# Patient Record
Sex: MALE | Race: WHITE | NOT HISPANIC OR LATINO | Employment: OTHER | URBAN - METROPOLITAN AREA
[De-identification: names, ages, dates, MRNs, and addresses within clinical notes are randomized per-mention and may not be internally consistent; named-entity substitution may affect disease eponyms.]

---
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
## 2023-03-04 PROBLEM — H61.23 BILATERAL IMPACTED CERUMEN: Status: ACTIVE | Noted: 2023-03-04

## 2023-03-04 PROBLEM — K59.00 CONSTIPATION: Status: ACTIVE | Noted: 2023-03-04

## 2023-03-04 PROBLEM — N62 GYNECOMASTIA, MALE: Status: ACTIVE | Noted: 2023-03-04

## 2023-03-04 PROBLEM — N64.4 BREAST PAIN IN MALE: Status: ACTIVE | Noted: 2023-03-04

## 2023-03-04 PROBLEM — M25.471 RIGHT ANKLE SWELLING: Status: ACTIVE | Noted: 2023-03-04

## 2023-03-04 PROBLEM — M25.571 RIGHT ANKLE PAIN: Status: ACTIVE | Noted: 2023-03-04

## 2023-03-04 PROBLEM — G56.01 CARPAL TUNNEL SYNDROME OF RIGHT WRIST: Status: ACTIVE | Noted: 2023-03-04

## 2023-03-04 RX ORDER — GABAPENTIN 100 MG/1
1 CAPSULE ORAL
COMMUNITY
Start: 2021-05-10 | End: 2023-03-14 | Stop reason: ALTCHOICE

## 2023-03-12 ASSESSMENT — ENCOUNTER SYMPTOMS
JOINT SWELLING: 0
POLYPHAGIA: 0
POLYDIPSIA: 0
NAUSEA: 0
SORE THROAT: 0
CHEST TIGHTNESS: 0
HEADACHES: 0
NUMBNESS: 0
EYE DISCHARGE: 0
ABDOMINAL DISTENTION: 0
COLOR CHANGE: 0
ABDOMINAL PAIN: 0
EYE PAIN: 0
ARTHRALGIAS: 0
FEVER: 0
CONFUSION: 0
DIARRHEA: 0
CHILLS: 0
FACIAL SWELLING: 0
APPETITE CHANGE: 0
COUGH: 0
HEMATURIA: 0
PALPITATIONS: 0
WEAKNESS: 0
CHOKING: 0
FREQUENCY: 0
FATIGUE: 0
DIFFICULTY URINATING: 0
SLEEP DISTURBANCE: 0
VOMITING: 0
CONSTIPATION: 0
ANAL BLEEDING: 0
NERVOUS/ANXIOUS: 0
WHEEZING: 0
SHORTNESS OF BREATH: 0
TREMORS: 0
DIZZINESS: 0
MYALGIAS: 0

## 2023-03-13 NOTE — PROGRESS NOTES
"Subjective   Patient ID: Barbara Armenta is a 67 y.o. male with a history of BPH and carpal tunnel of the right wrist who presents for Constipation.    HPI The patient is complaining of constipation which he has been having for a year. It has been getting worse in the past month.  He is taking magnesium citrate without much improvement.  He denies hematochezia or melena.  No abdominal pain, nausea or vomiting.  The patient wakes up 3 or 4 times at night to urinate and cannot fall back asleep.  Denies hematuria.    Review of Systems   Constitutional:  Negative for appetite change, chills, fatigue and fever.   HENT:  Negative for congestion, ear pain, facial swelling, hearing loss, nosebleeds and sore throat.    Eyes:  Negative for pain, discharge and visual disturbance.   Respiratory:  Negative for cough, choking, chest tightness, shortness of breath and wheezing.    Cardiovascular:  Negative for chest pain, palpitations and leg swelling.   Gastrointestinal:  Negative for abdominal distention, abdominal pain, anal bleeding, constipation, diarrhea, nausea and vomiting.   Endocrine: Negative for cold intolerance, heat intolerance, polydipsia, polyphagia and polyuria.   Genitourinary:  Negative for difficulty urinating, frequency, hematuria and urgency.   Musculoskeletal:  Negative for arthralgias, gait problem, joint swelling and myalgias.   Skin:  Negative for color change and rash.   Neurological:  Negative for dizziness, tremors, syncope, weakness, numbness and headaches.   Psychiatric/Behavioral:  Negative for behavioral problems, confusion, sleep disturbance and suicidal ideas. The patient is not nervous/anxious.        Objective   /80   Temp 36.2 °C (97.2 °F)   Ht 1.816 m (5' 11.5\")   Wt 84.4 kg (186 lb)   BMI 25.58 kg/m²     Physical Exam  Constitutional:       General: He is not in acute distress.     Appearance: Normal appearance.   HENT:      Head: Normocephalic and atraumatic.      Nose: Nose " normal.   Eyes:      Extraocular Movements: Extraocular movements intact.      Conjunctiva/sclera: Conjunctivae normal.      Pupils: Pupils are equal, round, and reactive to light.   Cardiovascular:      Rate and Rhythm: Normal rate and regular rhythm.      Pulses: Normal pulses.      Heart sounds: Normal heart sounds.   Pulmonary:      Effort: Pulmonary effort is normal.      Breath sounds: Normal breath sounds.   Abdominal:      General: Bowel sounds are normal.      Palpations: Abdomen is soft.   Musculoskeletal:         General: Normal range of motion.      Cervical back: Normal range of motion and neck supple.   Neurological:      General: No focal deficit present.      Mental Status: He is alert and oriented to person, place, and time.   Psychiatric:         Mood and Affect: Mood normal.         Behavior: Behavior normal.         Thought Content: Thought content normal.         Judgment: Judgment normal.         Assessment/Plan     Constipation  Worsening  Start MiraLAX as directed  Drink plenty of fluids  Increase fiber diet  Colonoscopy never done, requisition is given to you    Nocturia  Start Tamsulosin 0.4 mg daily  We will obtain PSA today    Right ankle pain   resolved     Carpal tunnel of the right wrist  Resolved     Health maintenance       Colonoscopy never done, requisition is given to you

## 2023-03-14 ENCOUNTER — OFFICE VISIT (OUTPATIENT)
Dept: PRIMARY CARE | Facility: CLINIC | Age: 68
End: 2023-03-14
Payer: MEDICARE

## 2023-03-14 VITALS
HEIGHT: 72 IN | SYSTOLIC BLOOD PRESSURE: 128 MMHG | WEIGHT: 186 LBS | DIASTOLIC BLOOD PRESSURE: 80 MMHG | TEMPERATURE: 97.2 F | BODY MASS INDEX: 25.19 KG/M2

## 2023-03-14 DIAGNOSIS — Z12.11 ENCOUNTER FOR SCREENING FOR COLORECTAL MALIGNANT NEOPLASM: ICD-10-CM

## 2023-03-14 DIAGNOSIS — Z12.12 ENCOUNTER FOR SCREENING FOR COLORECTAL MALIGNANT NEOPLASM: ICD-10-CM

## 2023-03-14 DIAGNOSIS — R53.83 OTHER FATIGUE: ICD-10-CM

## 2023-03-14 DIAGNOSIS — R35.1 NOCTURIA: ICD-10-CM

## 2023-03-14 DIAGNOSIS — E78.2 MIXED HYPERLIPIDEMIA: ICD-10-CM

## 2023-03-14 DIAGNOSIS — K59.04 CHRONIC IDIOPATHIC CONSTIPATION: Primary | ICD-10-CM

## 2023-03-14 DIAGNOSIS — E55.9 MILD VITAMIN D DEFICIENCY: ICD-10-CM

## 2023-03-14 PROCEDURE — 1160F RVW MEDS BY RX/DR IN RCRD: CPT | Performed by: PHYSICIAN ASSISTANT

## 2023-03-14 PROCEDURE — 99214 OFFICE O/P EST MOD 30 MIN: CPT | Performed by: PHYSICIAN ASSISTANT

## 2023-03-14 PROCEDURE — 36415 COLL VENOUS BLD VENIPUNCTURE: CPT | Performed by: PHYSICIAN ASSISTANT

## 2023-03-14 PROCEDURE — 85025 COMPLETE CBC W/AUTO DIFF WBC: CPT | Performed by: PHYSICIAN ASSISTANT

## 2023-03-14 PROCEDURE — 84443 ASSAY THYROID STIM HORMONE: CPT | Performed by: PHYSICIAN ASSISTANT

## 2023-03-14 PROCEDURE — 82306 VITAMIN D 25 HYDROXY: CPT | Performed by: PHYSICIAN ASSISTANT

## 2023-03-14 PROCEDURE — 80053 COMPREHEN METABOLIC PANEL: CPT | Performed by: PHYSICIAN ASSISTANT

## 2023-03-14 PROCEDURE — 1036F TOBACCO NON-USER: CPT | Performed by: PHYSICIAN ASSISTANT

## 2023-03-14 PROCEDURE — 80061 LIPID PANEL: CPT | Performed by: PHYSICIAN ASSISTANT

## 2023-03-14 PROCEDURE — 1159F MED LIST DOCD IN RCRD: CPT | Performed by: PHYSICIAN ASSISTANT

## 2023-03-14 RX ORDER — POLYETHYLENE GLYCOL 3350 17 G/17G
17 POWDER, FOR SOLUTION ORAL DAILY
Qty: 500 EACH | Refills: 2 | Status: SHIPPED | OUTPATIENT
Start: 2023-03-14 | End: 2023-07-12

## 2023-03-14 RX ORDER — TAMSULOSIN HYDROCHLORIDE 0.4 MG/1
0.4 CAPSULE ORAL
Qty: 90 CAPSULE | Refills: 3 | Status: SHIPPED | OUTPATIENT
Start: 2023-03-14 | End: 2023-12-05 | Stop reason: SDUPTHER

## 2023-03-14 ASSESSMENT — ENCOUNTER SYMPTOMS
LOSS OF SENSATION IN FEET: 0
OCCASIONAL FEELINGS OF UNSTEADINESS: 0
DEPRESSION: 0

## 2023-03-14 NOTE — PATIENT INSTRUCTIONS
Constipation  Worsening  Start MiraLAX as directed  Drink plenty of fluids  Increase fiber diet  Colonoscopy never done, requisition is given to you    Nocturia  Start Tamsulosin 0.4 mg daily  We will obtain PSA today    Right ankle pain   resolved     Carpal tunnel of the right wrist  Resolved     Health maintenance       Colonoscopy never done, requisition is given to you

## 2023-03-17 DIAGNOSIS — E55.9 MILD VITAMIN D DEFICIENCY: ICD-10-CM

## 2023-03-17 DIAGNOSIS — E03.9 ACQUIRED HYPOTHYROIDISM: Primary | ICD-10-CM

## 2023-03-17 RX ORDER — ACETAMINOPHEN 500 MG
50 TABLET ORAL DAILY
Qty: 90 CAPSULE | Refills: 3 | Status: SHIPPED | OUTPATIENT
Start: 2023-03-17 | End: 2023-12-05 | Stop reason: SDUPTHER

## 2023-03-17 RX ORDER — LEVOTHYROXINE SODIUM 25 UG/1
25 TABLET ORAL DAILY
Qty: 30 TABLET | Refills: 3 | Status: SHIPPED | OUTPATIENT
Start: 2023-03-17 | End: 2023-12-05 | Stop reason: SDUPTHER

## 2023-11-30 ASSESSMENT — ENCOUNTER SYMPTOMS
NUMBNESS: 0
DIZZINESS: 0
HEMATURIA: 0
COUGH: 0
CONSTIPATION: 0
WHEEZING: 0
FATIGUE: 0
CONFUSION: 0
APPETITE CHANGE: 0
PALPITATIONS: 0
ANAL BLEEDING: 0
POLYDIPSIA: 0
ABDOMINAL DISTENTION: 0
CHOKING: 0
NERVOUS/ANXIOUS: 0
CHILLS: 0
JOINT SWELLING: 0
COLOR CHANGE: 0
EYE PAIN: 0
SLEEP DISTURBANCE: 0
WEAKNESS: 0
NAUSEA: 0
SHORTNESS OF BREATH: 0
FREQUENCY: 0
VOMITING: 0
FACIAL SWELLING: 0
TREMORS: 0
EYE DISCHARGE: 0
HEADACHES: 0
ARTHRALGIAS: 0
DIARRHEA: 0
DIFFICULTY URINATING: 0
CHEST TIGHTNESS: 0
MYALGIAS: 0
ABDOMINAL PAIN: 0
SORE THROAT: 0
FEVER: 0
POLYPHAGIA: 0

## 2023-12-01 NOTE — PROGRESS NOTES
Subjective   Patient ID: Barbara Armenta is a 68 y.o. male with a history of BPH, hx of duodenal ulcer, and carpal tunnel of the right wrist who presents for Annual Exam.    HPI The patient is complaining of insomnia. Stated that he falls asleep but usually wakes up in 2 hours and occasionally in 4 hours. He denies depression, anxiety and stress.  He wakes up 3 times at night to urinate. The patient stopped taking all his medications.  He could not provide the reason.  His constipation was well-controlled with MiraLAX but he ran out of it.  Needs refill.  The patient has a history of duodenal ulcer and stated he feels that it started bothering him again.  He started taking OTC Prilosec with some improvement.  He is not on a diet.  Today he denies shortness of breath or chest pain.  There is no nausea or vomiting.  Denies headaches or dizziness.    Review of Systems   Constitutional:  Negative for appetite change, chills, fatigue and fever.   HENT:  Negative for congestion, ear pain, facial swelling, hearing loss, nosebleeds and sore throat.    Eyes:  Negative for pain, discharge and visual disturbance.   Respiratory:  Negative for cough, choking, chest tightness, shortness of breath and wheezing.    Cardiovascular:  Negative for chest pain, palpitations and leg swelling.   Gastrointestinal:  Negative for abdominal distention, abdominal pain, anal bleeding, constipation, diarrhea, nausea and vomiting.   Endocrine: Negative for cold intolerance, heat intolerance, polydipsia, polyphagia and polyuria.   Genitourinary:  Negative for difficulty urinating, frequency, hematuria and urgency.   Musculoskeletal:  Negative for arthralgias, gait problem, joint swelling and myalgias.   Skin:  Negative for color change and rash.   Neurological:  Negative for dizziness, tremors, syncope, weakness, numbness and headaches.   Psychiatric/Behavioral:  Negative for behavioral problems, confusion, sleep disturbance and suicidal ideas. The  "patient is not nervous/anxious.        Objective   /82   Temp 36.3 °C (97.3 °F)   Ht 1.816 m (5' 11.5\")   Wt 88 kg (194 lb)   BMI 26.68 kg/m²     Physical Exam  Constitutional:       General: He is not in acute distress.     Appearance: Normal appearance.   HENT:      Head: Normocephalic and atraumatic.      Nose: Nose normal.   Eyes:      Extraocular Movements: Extraocular movements intact.      Conjunctiva/sclera: Conjunctivae normal.      Pupils: Pupils are equal, round, and reactive to light.   Cardiovascular:      Rate and Rhythm: Normal rate and regular rhythm.      Pulses: Normal pulses.      Heart sounds: Normal heart sounds.   Pulmonary:      Effort: Pulmonary effort is normal.      Breath sounds: Normal breath sounds.   Abdominal:      General: Bowel sounds are normal.      Palpations: Abdomen is soft.   Musculoskeletal:         General: Normal range of motion.      Cervical back: Normal range of motion and neck supple.   Neurological:      General: No focal deficit present.      Mental Status: He is alert and oriented to person, place, and time.   Psychiatric:         Mood and Affect: Mood normal.         Behavior: Behavior normal.         Thought Content: Thought content normal.         Judgment: Judgment normal.         Assessment/Plan     Wellness exam  Colonoscopy, declined  Agreed to Cologuard, ordered  Vaccines declined  See your dentist twice a year  Yearly eye exam  see dermatology once a year for a skin check.    Weight maintenance:  Normal BMI  Try to have as many home cooked meals as possible  Avoid processed foods which contain excess calories, sugar, and sodium.  Exercise recommendations:   150 minutes a week to maintain your weight   If you have to lose weight, you need a better diet and exercise plan.     Please get your Living will / Advance directive completed if you do not have one already. Please make sure our office has a copy of the latest one.    Insomnia  Start trazodone " 50 mg at bedtime  Sleep hygiene recommended    Constipation  Resume MiraLAX   Drink plenty of fluids  Increase fiber diet  Colonoscopy never done, requisition is given to you    Hypothyroidism  Patient self stopped levothyroxine  Try to exercise regularly  We obtained TSH today    hx of duodenal ulcer  On OTC Prilosec   Avoid caffeine and alcoholic beverages  Avoid spicy and acidic food, such as tomato and citrus fruits  Avoid onions, peppermint and spearmint   Eat small, frequent meals  Do not eat late at night, at least 3 hours before bed  Raise the head of the bed 6-8 inches for sleeping  Wear lose-fitting clothes around your waist   Prescription for omeprazole 40 mg was sent to the pharmacy    Nocturia  Self stopped tamsulosin  resume Tamsulosin 0.4 mg daily  We will obtain PSA today    Vitamin D deficiency  Self stopped vitamin D 2000 units     Carpal tunnel of the right wrist  Resolved

## 2023-12-01 NOTE — PROGRESS NOTES
Subjective   Reason for Visit: Barbara Armenta is an 68 y.o. male here for a Medicare Wellness visit.               HPI    Patient Care Team:  Irma Ashton PA-C as PCP - General (Internal Medicine)     Review of Systems    Objective   Vitals:  There were no vitals taken for this visit.      Physical Exam    Assessment/Plan   Problem List Items Addressed This Visit    None

## 2023-12-05 ENCOUNTER — OFFICE VISIT (OUTPATIENT)
Dept: PRIMARY CARE | Facility: CLINIC | Age: 68
End: 2023-12-05
Payer: MEDICARE

## 2023-12-05 VITALS
TEMPERATURE: 97.3 F | WEIGHT: 194 LBS | HEIGHT: 72 IN | SYSTOLIC BLOOD PRESSURE: 130 MMHG | DIASTOLIC BLOOD PRESSURE: 82 MMHG | BODY MASS INDEX: 26.28 KG/M2

## 2023-12-05 DIAGNOSIS — F51.01 PRIMARY INSOMNIA: ICD-10-CM

## 2023-12-05 DIAGNOSIS — Z87.19 HISTORY OF DUODENAL ULCER: ICD-10-CM

## 2023-12-05 DIAGNOSIS — Z00.00 ROUTINE GENERAL MEDICAL EXAMINATION AT HEALTH CARE FACILITY: Primary | ICD-10-CM

## 2023-12-05 DIAGNOSIS — E78.2 MIXED HYPERLIPIDEMIA: ICD-10-CM

## 2023-12-05 DIAGNOSIS — R53.83 OTHER FATIGUE: ICD-10-CM

## 2023-12-05 DIAGNOSIS — E03.9 ACQUIRED HYPOTHYROIDISM: ICD-10-CM

## 2023-12-05 DIAGNOSIS — R35.1 NOCTURIA: ICD-10-CM

## 2023-12-05 DIAGNOSIS — Z12.11 ENCOUNTER FOR SCREENING FOR MALIGNANT NEOPLASM OF COLON: ICD-10-CM

## 2023-12-05 DIAGNOSIS — E55.9 MILD VITAMIN D DEFICIENCY: ICD-10-CM

## 2023-12-05 DIAGNOSIS — K59.04 CHRONIC IDIOPATHIC CONSTIPATION: ICD-10-CM

## 2023-12-05 PROBLEM — H61.23 BILATERAL IMPACTED CERUMEN: Status: RESOLVED | Noted: 2023-03-04 | Resolved: 2023-12-05

## 2023-12-05 PROBLEM — N64.4 BREAST PAIN IN MALE: Status: RESOLVED | Noted: 2023-03-04 | Resolved: 2023-12-05

## 2023-12-05 PROCEDURE — 85025 COMPLETE CBC W/AUTO DIFF WBC: CPT | Performed by: PHYSICIAN ASSISTANT

## 2023-12-05 PROCEDURE — 1160F RVW MEDS BY RX/DR IN RCRD: CPT | Performed by: PHYSICIAN ASSISTANT

## 2023-12-05 PROCEDURE — 1159F MED LIST DOCD IN RCRD: CPT | Performed by: PHYSICIAN ASSISTANT

## 2023-12-05 PROCEDURE — 80053 COMPREHEN METABOLIC PANEL: CPT | Performed by: PHYSICIAN ASSISTANT

## 2023-12-05 PROCEDURE — 84153 ASSAY OF PSA TOTAL: CPT | Performed by: PHYSICIAN ASSISTANT

## 2023-12-05 PROCEDURE — 1036F TOBACCO NON-USER: CPT | Performed by: PHYSICIAN ASSISTANT

## 2023-12-05 PROCEDURE — 99214 OFFICE O/P EST MOD 30 MIN: CPT | Performed by: PHYSICIAN ASSISTANT

## 2023-12-05 PROCEDURE — G0438 PPPS, INITIAL VISIT: HCPCS | Performed by: PHYSICIAN ASSISTANT

## 2023-12-05 PROCEDURE — 84443 ASSAY THYROID STIM HORMONE: CPT | Performed by: PHYSICIAN ASSISTANT

## 2023-12-05 PROCEDURE — 80061 LIPID PANEL: CPT | Performed by: PHYSICIAN ASSISTANT

## 2023-12-05 PROCEDURE — 82306 VITAMIN D 25 HYDROXY: CPT | Performed by: PHYSICIAN ASSISTANT

## 2023-12-05 PROCEDURE — 1170F FXNL STATUS ASSESSED: CPT | Performed by: PHYSICIAN ASSISTANT

## 2023-12-05 RX ORDER — ACETAMINOPHEN 500 MG
2000 TABLET ORAL DAILY
Qty: 90 CAPSULE | Refills: 3 | Status: SHIPPED | OUTPATIENT
Start: 2023-12-05

## 2023-12-05 RX ORDER — LEVOTHYROXINE SODIUM 25 UG/1
25 TABLET ORAL DAILY
Qty: 30 TABLET | Refills: 3 | Status: SHIPPED | OUTPATIENT
Start: 2023-12-05 | End: 2024-12-04

## 2023-12-05 RX ORDER — POLYETHYLENE GLYCOL 3350 17 G/17G
17 POWDER, FOR SOLUTION ORAL DAILY
Qty: 765 G | Refills: 3 | Status: SHIPPED | OUTPATIENT
Start: 2023-12-05 | End: 2024-06-02

## 2023-12-05 RX ORDER — TAMSULOSIN HYDROCHLORIDE 0.4 MG/1
0.4 CAPSULE ORAL
Qty: 90 CAPSULE | Refills: 3 | Status: SHIPPED | OUTPATIENT
Start: 2023-12-05

## 2023-12-05 RX ORDER — TRAZODONE HYDROCHLORIDE 50 MG/1
50 TABLET ORAL NIGHTLY PRN
Qty: 30 TABLET | Refills: 5 | Status: SHIPPED | OUTPATIENT
Start: 2023-12-05 | End: 2024-12-04

## 2023-12-05 RX ORDER — OMEPRAZOLE 40 MG/1
40 CAPSULE, DELAYED RELEASE ORAL
Qty: 30 CAPSULE | Refills: 2 | Status: SHIPPED | OUTPATIENT
Start: 2023-12-05 | End: 2024-12-04

## 2023-12-05 ASSESSMENT — PATIENT HEALTH QUESTIONNAIRE - PHQ9
SUM OF ALL RESPONSES TO PHQ9 QUESTIONS 1 AND 2: 0
2. FEELING DOWN, DEPRESSED OR HOPELESS: NOT AT ALL
1. LITTLE INTEREST OR PLEASURE IN DOING THINGS: NOT AT ALL

## 2023-12-05 ASSESSMENT — ENCOUNTER SYMPTOMS
DEPRESSION: 0
OCCASIONAL FEELINGS OF UNSTEADINESS: 0
LOSS OF SENSATION IN FEET: 0

## 2023-12-05 ASSESSMENT — GERIATRIC MINI NUTRITIONAL ASSESSMENT (MNA)
D HAS SUFFERED PSYCHOLOGICAL STRESS OR ACUTE DISEASE IN THE PAST 3 MONTHS?: NO
C GENERAL MOBILITY: GOES OUT
E NEUROPSYCHOLOGICAL PROBLEMS: NO PSYCHOLOGICAL PROBLEMS
B WEIGHT LOSS DURING THE LAST 3 MONTHS: NO WEIGHT LOSS
A HAS FOOD INTAKE DECLINED OVER THE PAST 3 MONTHS DUE TO LOSS OF APPETITE, DIGESTIVE PROBLEMS, CHEWING OR SWALLOWING DIFFICULTIES?: NO DECREASE IN FOOD INTAKE

## 2023-12-05 ASSESSMENT — ACTIVITIES OF DAILY LIVING (ADL)
JUDGMENT_ADEQUATE_SAFELY_COMPLETE_DAILY_ACTIVITIES: YES
ADEQUATE_TO_COMPLETE_ADL: YES
TAKING MEDICATION: INDEPENDENT
FEEDING YOURSELF: INDEPENDENT
USING TELEPHONE: INDEPENDENT
HEARING - RIGHT EAR: FUNCTIONAL
DOING HOUSEWORK: INDEPENDENT
BATHING: INDEPENDENT
BATHING: INDEPENDENT
PATIENT'S MEMORY ADEQUATE TO SAFELY COMPLETE DAILY ACTIVITIES?: YES
NEEDS ASSISTANCE WITH FOOD: INDEPENDENT
ADEQUATE_TO_COMPLETE_ADL: YES
TAKING_MEDICATION: INDEPENDENT
ASSISTIVE_DEVICE: DENTURES LOWER;DENTURES UPPER
DRESSING YOURSELF: INDEPENDENT
TOILETING: INDEPENDENT
GROCERY_SHOPPING: INDEPENDENT
JUDGMENT_ADEQUATE_SAFELY_COMPLETE_DAILY_ACTIVITIES: YES
HEARING - LEFT EAR: FUNCTIONAL
DRESSING: INDEPENDENT
MANAGING FINANCES: INDEPENDENT
FEEDING: INDEPENDENT
WALKS IN HOME: INDEPENDENT
DOING_HOUSEWORK: INDEPENDENT
PILL BOX USED: NO
BATHING: INDEPENDENT
ASSISTIVE_DEVICE: EYEGLASSES
USING TRANSPORTATION: INDEPENDENT
GROOMING: INDEPENDENT
STIL DRIVING: YES
MANAGING_FINANCES: INDEPENDENT
TOILETING: INDEPENDENT
DRESSING: INDEPENDENT
EATING: INDEPENDENT
PREPARING MEALS: INDEPENDENT
GROCERY SHOPPING: INDEPENDENT

## 2023-12-05 ASSESSMENT — COLUMBIA-SUICIDE SEVERITY RATING SCALE - C-SSRS
6. HAVE YOU EVER DONE ANYTHING, STARTED TO DO ANYTHING, OR PREPARED TO DO ANYTHING TO END YOUR LIFE?: NO
2. HAVE YOU ACTUALLY HAD ANY THOUGHTS OF KILLING YOURSELF?: NO
1. IN THE PAST MONTH, HAVE YOU WISHED YOU WERE DEAD OR WISHED YOU COULD GO TO SLEEP AND NOT WAKE UP?: NO

## 2023-12-05 NOTE — PROGRESS NOTES
"Subjective   Reason for Visit: Barbara Armenta is an 68 y.o. male here for a Medicare Wellness visit.     Past Medical, Surgical, and Family History reviewed and updated in chart.    Reviewed all medications by prescribing practitioner or clinical pharmacist (such as prescriptions, OTCs, herbal therapies and supplements) and documented in the medical record.    HPI    Patient Care Team:  Irma Ashton PA-C as PCP - General (Internal Medicine)     Review of Systems    Objective   Vitals:  /82   Temp 36.3 °C (97.3 °F)   Ht 1.816 m (5' 11.5\")   Wt 88 kg (194 lb)   BMI 26.68 kg/m²       Physical Exam    Assessment/Plan   Problem List Items Addressed This Visit       Primary insomnia    Relevant Medications    traZODone (Desyrel) 50 mg tablet    Nocturia    Relevant Medications    tamsulosin (Flomax) 0.4 mg 24 hr capsule    Other Relevant Orders    Prostate Specific Antigen    Mixed hyperlipidemia    Relevant Orders    Lipid Panel    Mild vitamin D deficiency    Relevant Orders    Vitamin D 25-Hydroxy,Total (for eval of Vitamin D levels)    History of duodenal ulcer    Relevant Medications    omeprazole (PriLOSEC) 40 mg DR capsule    Encounter for screening for malignant neoplasm of colon - Primary    Relevant Orders    Cologuard® colon cancer screening    Constipation    Relevant Medications    polyethylene glycol (Glycolax, Miralax) 17 gram/dose powder    Acquired hypothyroidism    Relevant Orders    Thyroid Stimulating Hormone     Other Visit Diagnoses       Other fatigue        Relevant Orders    CBC    Comprehensive Metabolic Panel               "

## 2024-02-24 LAB — NONINV COLON CA DNA+OCC BLD SCRN STL QL: NEGATIVE

## 2024-12-06 ENCOUNTER — APPOINTMENT (OUTPATIENT)
Dept: PRIMARY CARE | Facility: CLINIC | Age: 69
End: 2024-12-06
Payer: MEDICARE

## 2024-12-06 VITALS
SYSTOLIC BLOOD PRESSURE: 130 MMHG | BODY MASS INDEX: 27.51 KG/M2 | TEMPERATURE: 98.1 F | DIASTOLIC BLOOD PRESSURE: 80 MMHG | WEIGHT: 200 LBS

## 2024-12-06 DIAGNOSIS — R53.83 OTHER FATIGUE: ICD-10-CM

## 2024-12-06 DIAGNOSIS — N52.9 ERECTILE DYSFUNCTION, UNSPECIFIED ERECTILE DYSFUNCTION TYPE: ICD-10-CM

## 2024-12-06 DIAGNOSIS — Z13.1 DIABETES MELLITUS SCREENING: ICD-10-CM

## 2024-12-06 DIAGNOSIS — R35.1 NOCTURIA: ICD-10-CM

## 2024-12-06 DIAGNOSIS — N13.8 BPH WITH OBSTRUCTION/LOWER URINARY TRACT SYMPTOMS: ICD-10-CM

## 2024-12-06 DIAGNOSIS — N40.1 BPH WITH OBSTRUCTION/LOWER URINARY TRACT SYMPTOMS: ICD-10-CM

## 2024-12-06 DIAGNOSIS — F51.01 PRIMARY INSOMNIA: ICD-10-CM

## 2024-12-06 DIAGNOSIS — K59.04 CHRONIC IDIOPATHIC CONSTIPATION: ICD-10-CM

## 2024-12-06 DIAGNOSIS — E78.2 MIXED HYPERLIPIDEMIA: ICD-10-CM

## 2024-12-06 DIAGNOSIS — Z00.00 ROUTINE GENERAL MEDICAL EXAMINATION AT HEALTH CARE FACILITY: Primary | ICD-10-CM

## 2024-12-06 DIAGNOSIS — E55.9 MILD VITAMIN D DEFICIENCY: ICD-10-CM

## 2024-12-06 DIAGNOSIS — E03.9 ACQUIRED HYPOTHYROIDISM: ICD-10-CM

## 2024-12-06 LAB
25(OH)D3 SERPL-MCNC: 24 NG/ML (ref 30–100)
ALT SERPL W P-5'-P-CCNC: 48 U/L (ref 14–59)
ANION GAP SERPL CALC-SCNC: 14 MMOL/L (ref 10–20)
AST SERPL W P-5'-P-CCNC: 25 U/L (ref 15–37)
BASOPHILS # BLD AUTO: 0.04 X10*3/UL (ref 0.1–1.6)
BASOPHILS NFR BLD AUTO: 0.35 % (ref 0–0.3)
BUN SERPL-MCNC: 16 MG/DL (ref 7–18)
CALCIUM SERPL-MCNC: 9.5 MG/DL (ref 8.5–10.1)
CHLORIDE SERPL-SCNC: 101 MMOL/L (ref 98–107)
CHOLEST SERPL-MCNC: 172 MG/DL (ref 0–199)
CHOLESTEROL/HDL RATIO: 4 (ref 4.2–7)
CO2 SERPL-SCNC: 28 MMOL/L (ref 21–32)
CREAT SERPL-MCNC: 0.96 MG/DL (ref 0.6–1.1)
EGFRCR SERPLBLD CKD-EPI 2021: 87 ML/MIN/1.73M*2
EOSINOPHIL # BLD AUTO: 0.32 X10*3/UL (ref 0.04–0.5)
EOSINOPHIL NFR BLD AUTO: 3.05 % (ref 0.7–7)
ERYTHROCYTE [DISTWIDTH] IN BLOOD BY AUTOMATED COUNT: 12.7 % (ref 11.5–14.5)
GLUCOSE SERPL-MCNC: 100 MG/DL (ref 74–100)
HBA1C MFR BLD: 5.3 %
HCT VFR BLD AUTO: 46.5 % (ref 38.4–51.3)
HDLC SERPL-MCNC: 43 MG/DL (ref 40–59)
HGB BLD-MCNC: 16.56 G/DL (ref 12.7–17)
IS PATIENT FASTING: YES
LDLC SERPL DIRECT ASSAY-MCNC: 103 MG/DL (ref 0–100)
LYMPHOCYTES # BLD AUTO: 2.62 X10*3/UL (ref 0–6)
LYMPHOCYTES NFR BLD AUTO: 24.89 % (ref 20.5–51.1)
MCH RBC QN AUTO: 32.9 PG (ref 26–32)
MCHC RBC AUTO-ENTMCNC: 35.6 G/DL (ref 31–38)
MCV RBC AUTO: 92.5 FL (ref 80–96)
MONOCYTES # BLD AUTO: 1.26 X10*3/UL (ref 1.6–24.9)
MONOCYTES NFR BLD AUTO: 11.98 % (ref 1.7–9.3)
NEUTROPHILS # BLD AUTO: 6.3 X10*3/UL (ref 1.4–6.5)
NEUTROPHILS NFR BLD AUTO: 59.73 % (ref 42.2–75.2)
PLATELET # BLD AUTO: 128.9 X10*3/UL (ref 150–450)
PMV BLD AUTO: 9 FL (ref 7.8–11)
POTASSIUM SERPL-SCNC: 4.6 MMOL/L (ref 3.5–5.1)
PSA SERPL-MCNC: 0.86 NG/ML
RBC # BLD AUTO: 5.03 X10*6/UL (ref 4.1–5.6)
SODIUM SERPL-SCNC: 138 MMOL/L (ref 136–145)
T4 FREE SERPL-MCNC: 1.23 NG/DL (ref 0.78–1.48)
TRIGL SERPL-MCNC: 147 MG/DL
TSH SERPL-ACNC: 7.38 MIU/L (ref 0.44–3.98)
WBC # BLD AUTO: 10.54 X10*3/UL (ref 4.5–10.5)

## 2024-12-06 PROCEDURE — 1126F AMNT PAIN NOTED NONE PRSNT: CPT | Performed by: PHYSICIAN ASSISTANT

## 2024-12-06 PROCEDURE — 82306 VITAMIN D 25 HYDROXY: CPT | Performed by: PHYSICIAN ASSISTANT

## 2024-12-06 PROCEDURE — 84439 ASSAY OF FREE THYROXINE: CPT

## 2024-12-06 PROCEDURE — 83036 HEMOGLOBIN GLYCOSYLATED A1C: CPT | Performed by: PHYSICIAN ASSISTANT

## 2024-12-06 PROCEDURE — 1159F MED LIST DOCD IN RCRD: CPT | Performed by: PHYSICIAN ASSISTANT

## 2024-12-06 PROCEDURE — 99214 OFFICE O/P EST MOD 30 MIN: CPT | Performed by: PHYSICIAN ASSISTANT

## 2024-12-06 PROCEDURE — 85025 COMPLETE CBC W/AUTO DIFF WBC: CPT | Performed by: PHYSICIAN ASSISTANT

## 2024-12-06 PROCEDURE — 1170F FXNL STATUS ASSESSED: CPT | Performed by: PHYSICIAN ASSISTANT

## 2024-12-06 PROCEDURE — 83721 ASSAY OF BLOOD LIPOPROTEIN: CPT | Performed by: PHYSICIAN ASSISTANT

## 2024-12-06 PROCEDURE — 1160F RVW MEDS BY RX/DR IN RCRD: CPT | Performed by: PHYSICIAN ASSISTANT

## 2024-12-06 PROCEDURE — 1036F TOBACCO NON-USER: CPT | Performed by: PHYSICIAN ASSISTANT

## 2024-12-06 PROCEDURE — G0439 PPPS, SUBSEQ VISIT: HCPCS | Performed by: PHYSICIAN ASSISTANT

## 2024-12-06 RX ORDER — TADALAFIL 10 MG/1
10 TABLET ORAL DAILY PRN
Qty: 20 TABLET | Refills: 5 | Status: SHIPPED | OUTPATIENT
Start: 2024-12-06 | End: 2025-12-06

## 2024-12-06 RX ORDER — TAMSULOSIN HYDROCHLORIDE 0.4 MG/1
0.4 CAPSULE ORAL
Qty: 90 CAPSULE | Refills: 3 | Status: SHIPPED | OUTPATIENT
Start: 2024-12-06

## 2024-12-06 RX ORDER — POLYETHYLENE GLYCOL 3350 17 G/17G
17 POWDER, FOR SOLUTION ORAL DAILY
Qty: 510 G | Refills: 6 | Status: SHIPPED | OUTPATIENT
Start: 2024-12-06 | End: 2025-07-04

## 2024-12-06 ASSESSMENT — PAIN SCALES - GENERAL: PAINLEVEL_OUTOF10: 0-NO PAIN

## 2024-12-06 ASSESSMENT — ACTIVITIES OF DAILY LIVING (ADL)
TAKING MEDICATION: INDEPENDENT
BATHING: INDEPENDENT
FEEDING YOURSELF: INDEPENDENT
USING TRANSPORTATION: INDEPENDENT
HEARING - LEFT EAR: FUNCTIONAL
DOING HOUSEWORK: INDEPENDENT
PATIENT'S MEMORY ADEQUATE TO SAFELY COMPLETE DAILY ACTIVITIES?: YES
TOILETING: INDEPENDENT
DRESSING YOURSELF: INDEPENDENT
STIL DRIVING: YES
EATING: INDEPENDENT
GROOMING: INDEPENDENT
ADEQUATE_TO_COMPLETE_ADL: YES
PILL BOX USED: NO
JUDGMENT_ADEQUATE_SAFELY_COMPLETE_DAILY_ACTIVITIES: YES
HEARING - RIGHT EAR: FUNCTIONAL
JUDGMENT_ADEQUATE_SAFELY_COMPLETE_DAILY_ACTIVITIES: YES
BATHING: INDEPENDENT
MANAGING FINANCES: INDEPENDENT
USING TELEPHONE: INDEPENDENT
GROCERY SHOPPING: INDEPENDENT
NEEDS ASSISTANCE WITH FOOD: INDEPENDENT
PREPARING MEALS: INDEPENDENT
TOILETING: INDEPENDENT
ADEQUATE_TO_COMPLETE_ADL: YES
DRESSING: INDEPENDENT
FEEDING: INDEPENDENT

## 2024-12-06 ASSESSMENT — PATIENT HEALTH QUESTIONNAIRE - PHQ9
SUM OF ALL RESPONSES TO PHQ9 QUESTIONS 1 AND 2: 6
5. POOR APPETITE OR OVEREATING: NOT AT ALL
4. FEELING TIRED OR HAVING LITTLE ENERGY: NOT AT ALL
6. FEELING BAD ABOUT YOURSELF - OR THAT YOU ARE A FAILURE OR HAVE LET YOURSELF OR YOUR FAMILY DOWN: NOT AT ALL
SUM OF ALL RESPONSES TO PHQ QUESTIONS 1-9: 7
7. TROUBLE CONCENTRATING ON THINGS, SUCH AS READING THE NEWSPAPER OR WATCHING TELEVISION: NOT AT ALL
2. FEELING DOWN, DEPRESSED OR HOPELESS: NEARLY EVERY DAY
10. IF YOU CHECKED OFF ANY PROBLEMS, HOW DIFFICULT HAVE THESE PROBLEMS MADE IT FOR YOU TO DO YOUR WORK, TAKE CARE OF THINGS AT HOME, OR GET ALONG WITH OTHER PEOPLE: NOT DIFFICULT AT ALL
8. MOVING OR SPEAKING SO SLOWLY THAT OTHER PEOPLE COULD HAVE NOTICED. OR THE OPPOSITE, BEING SO FIGETY OR RESTLESS THAT YOU HAVE BEEN MOVING AROUND A LOT MORE THAN USUAL: NOT AT ALL
3. TROUBLE FALLING OR STAYING ASLEEP OR SLEEPING TOO MUCH: SEVERAL DAYS
9. THOUGHTS THAT YOU WOULD BE BETTER OFF DEAD, OR OF HURTING YOURSELF: NOT AT ALL
1. LITTLE INTEREST OR PLEASURE IN DOING THINGS: NEARLY EVERY DAY

## 2024-12-06 ASSESSMENT — ENCOUNTER SYMPTOMS
COUGH: 0
SHORTNESS OF BREATH: 0
NERVOUS/ANXIOUS: 0
DIZZINESS: 0
LOSS OF SENSATION IN FEET: 0
PALPITATIONS: 0
CONSTIPATION: 0
COLOR CHANGE: 0
CHILLS: 0
WEAKNESS: 0
FEVER: 0
VOMITING: 0
CHOKING: 0
JOINT SWELLING: 0
ARTHRALGIAS: 0
NUMBNESS: 0
FREQUENCY: 0
OCCASIONAL FEELINGS OF UNSTEADINESS: 0
TREMORS: 0
APPETITE CHANGE: 0
CONFUSION: 0
FATIGUE: 0
DIARRHEA: 0
HEMATURIA: 0
ABDOMINAL DISTENTION: 0
WHEEZING: 0
EYE PAIN: 0
DIFFICULTY URINATING: 0
SLEEP DISTURBANCE: 0
HEADACHES: 0
DEPRESSION: 0
POLYDIPSIA: 0
ABDOMINAL PAIN: 0
MYALGIAS: 0
FACIAL SWELLING: 0
ANAL BLEEDING: 0
EYE DISCHARGE: 0
POLYPHAGIA: 0
SORE THROAT: 0
NAUSEA: 0
CHEST TIGHTNESS: 0

## 2024-12-06 ASSESSMENT — COLUMBIA-SUICIDE SEVERITY RATING SCALE - C-SSRS
1. IN THE PAST MONTH, HAVE YOU WISHED YOU WERE DEAD OR WISHED YOU COULD GO TO SLEEP AND NOT WAKE UP?: NO
6. HAVE YOU EVER DONE ANYTHING, STARTED TO DO ANYTHING, OR PREPARED TO DO ANYTHING TO END YOUR LIFE?: NO
2. HAVE YOU ACTUALLY HAD ANY THOUGHTS OF KILLING YOURSELF?: NO

## 2024-12-06 ASSESSMENT — COGNITIVE AND FUNCTIONAL STATUS - GENERAL
VERBAL FLUENCY - ANIMAL NAMES (0 TO 25): 25
TRAIL MAKING TEST: PATIENT COMPLETES TRAIL MAKING TEST PROPERLY.

## 2024-12-06 ASSESSMENT — GERIATRIC MINI NUTRITIONAL ASSESSMENT (MNA)
C GENERAL MOBILITY: GOES OUT
D HAS SUFFERED PSYCHOLOGICAL STRESS OR ACUTE DISEASE IN THE PAST 3 MONTHS?: NO
E NEUROPSYCHOLOGICAL PROBLEMS: NO PSYCHOLOGICAL PROBLEMS
A HAS FOOD INTAKE DECLINED OVER THE PAST 3 MONTHS DUE TO LOSS OF APPETITE, DIGESTIVE PROBLEMS, CHEWING OR SWALLOWING DIFFICULTIES?: NO DECREASE IN FOOD INTAKE
B WEIGHT LOSS DURING THE LAST 3 MONTHS: NO WEIGHT LOSS

## 2024-12-06 ASSESSMENT — ANXIETY QUESTIONNAIRES
2. NOT BEING ABLE TO STOP OR CONTROL WORRYING: NOT AT ALL
6. BECOMING EASILY ANNOYED OR IRRITABLE: NOT AT ALL
4. TROUBLE RELAXING: NOT AT ALL
3. WORRYING TOO MUCH ABOUT DIFFERENT THINGS: NOT AT ALL
7. FEELING AFRAID AS IF SOMETHING AWFUL MIGHT HAPPEN: NOT AT ALL
1. FEELING NERVOUS, ANXIOUS, OR ON EDGE: NOT AT ALL
5. BEING SO RESTLESS THAT IT IS HARD TO SIT STILL: NOT AT ALL
GAD7 TOTAL SCORE: 0
IF YOU CHECKED OFF ANY PROBLEMS ON THIS QUESTIONNAIRE, HOW DIFFICULT HAVE THESE PROBLEMS MADE IT FOR YOU TO DO YOUR WORK, TAKE CARE OF THINGS AT HOME, OR GET ALONG WITH OTHER PEOPLE: NOT DIFFICULT AT ALL

## 2024-12-06 NOTE — PROGRESS NOTES
Subjective   Reason for Visit: Barbara Armenta is an 69 y.o. male here for a Medicare Wellness visit.     Past Medical, Surgical, and Family History reviewed and updated in chart.    Reviewed all medications by prescribing practitioner or clinical pharmacist (such as prescriptions, OTCs, herbal therapies and supplements) and documented in the medical record.    HPI    Patient Care Team:  Irma Ashton PA-C as PCP - General (Internal Medicine)     Review of Systems    Objective   Vitals:  /80   Temp 36.7 °C (98.1 °F) (Temporal)   Wt 90.7 kg (200 lb)   BMI 27.51 kg/m²       Physical Exam    Assessment & Plan  Nocturia    Orders:    tamsulosin (Flomax) 0.4 mg 24 hr capsule; Take 1 capsule (0.4 mg) by mouth once daily in the morning. Take before meals.    Erectile dysfunction, unspecified erectile dysfunction type    Orders:    tadalafil (Cialis) 10 mg tablet; Take 1 tablet (10 mg) by mouth once daily as needed for erectile dysfunction.    Routine general medical examination at health care facility    Orders:    1 Year Follow Up In Primary Care - Wellness Exam; Future    Mild vitamin D deficiency    Orders:    Vitamin D 25-Hydroxy,Total (for eval of Vitamin D levels)    BPH with obstruction/lower urinary tract symptoms    Orders:    Prostate Specific Antigen    Acquired hypothyroidism    Orders:    Thyroid Stimulating Hormone    Thyroxine, Free    Mixed hyperlipidemia    Orders:    Alanine Aminotransferase    Aspartate Aminotransferase    Lipid Panel    Diabetes mellitus screening    Orders:    Hemoglobin A1C    Other fatigue    Orders:    Basic Metabolic Panel    CBC w/5 Part Differential, [facility]    Chronic idiopathic constipation    Orders:    polyethylene glycol (Glycolax, Miralax) 17 gram/dose powder; Mix 17 g of powder and drink once daily.    Primary insomnia

## 2024-12-06 NOTE — PROGRESS NOTES
Subjective   Patient ID: Barbara Armenta is a 69 y.o. male with a history of BPH, hx of duodenal ulcer, and carpal tunnel of the right wrist who presents for health maintenance.    HPI The patient is presented for follow-up.  He does not take levothyroxine, trazodone, and tamsulosin and only takes omeprazole.  Stated he did not take tamsulosin since it was too expensive.  He urinates 3-4 times at night and sleeps 4-5 hours.  He has been having erectile dysfunction.  He exercises by walking and biking.  Denies shortness of breath, chest pain, leg edema, headaches, dizziness, nausea or vomiting.    Review of Systems   Constitutional:  Negative for appetite change, chills, fatigue and fever.   HENT:  Negative for congestion, ear pain, facial swelling, hearing loss, nosebleeds and sore throat.    Eyes:  Negative for pain, discharge and visual disturbance.   Respiratory:  Negative for cough, choking, chest tightness, shortness of breath and wheezing.    Cardiovascular:  Negative for chest pain, palpitations and leg swelling.   Gastrointestinal:  Negative for abdominal distention, abdominal pain, anal bleeding, constipation, diarrhea, nausea and vomiting.   Endocrine: Negative for cold intolerance, heat intolerance, polydipsia, polyphagia and polyuria.   Genitourinary:  Negative for difficulty urinating, frequency, hematuria and urgency.   Musculoskeletal:  Negative for arthralgias, gait problem, joint swelling and myalgias.   Skin:  Negative for color change and rash.   Neurological:  Negative for dizziness, tremors, syncope, weakness, numbness and headaches.   Psychiatric/Behavioral:  Negative for behavioral problems, confusion, sleep disturbance and suicidal ideas. The patient is not nervous/anxious.        Objective   /80   Temp 36.7 °C (98.1 °F) (Temporal)   Wt 90.7 kg (200 lb)   BMI 27.51 kg/m²     Physical Exam  Constitutional:       General: He is not in acute distress.     Appearance: Normal appearance.    HENT:      Head: Normocephalic and atraumatic.      Nose: Nose normal.   Eyes:      Extraocular Movements: Extraocular movements intact.      Conjunctiva/sclera: Conjunctivae normal.      Pupils: Pupils are equal, round, and reactive to light.   Cardiovascular:      Rate and Rhythm: Normal rate and regular rhythm.      Pulses: Normal pulses.      Heart sounds: Normal heart sounds.   Pulmonary:      Effort: Pulmonary effort is normal.      Breath sounds: Normal breath sounds.   Abdominal:      General: Bowel sounds are normal.      Palpations: Abdomen is soft.   Musculoskeletal:         General: Normal range of motion.      Cervical back: Normal range of motion and neck supple.   Neurological:      General: No focal deficit present.      Mental Status: He is alert and oriented to person, place, and time.   Psychiatric:         Mood and Affect: Mood normal.         Behavior: Behavior normal.         Thought Content: Thought content normal.         Judgment: Judgment normal.         Assessment/Plan   Wellness exam  [facility] 2/15/24  Vaccines declined  See your dentist twice a year  Yearly eye exam  see dermatology once a year for a skin check.    Weight maintenance:  Body mass index is 27.51 kg/m².  Try to have as many home cooked meals as possible  Avoid processed foods which contain excess calories, sugar, and sodium.  Exercise recommendations:   150 minutes a week to maintain your weight   If you have to lose weight, you need a better diet and exercise plan.     Erectile dysfunction  Cialis 10 mg as needed    Insomnia  Not taking trazodone  Sleep hygiene recommended    Constipation  Continue MiraLAX as needed  Drink plenty of fluids  Increase fiber diet    Hypothyroidism  self stopped levothyroxine  Try to exercise regularly    hx of duodenal ulcer  On omeprazole 40 mg 30 minutes before breakfast   Avoid caffeine and alcoholic beverages  Avoid spicy and acidic food, such as tomato and citrus fruits  Avoid onions,  peppermint and spearmint   Eat small, frequent meals  Do not eat late at night, at least 3 hours before bed  Raise the head of the bed 6-8 inches for sleeping  Wear lose-fitting clothes around your waist     Nocturia  Resume tamsulosin 0.4 mg daily  The patient was advised to use [employer] coupon to bring price down    Vitamin D deficiency  Self stopped vitamin D 2000 units     Carpal tunnel of the right wrist  Resolved     Follow-up annually per patient request

## 2024-12-06 NOTE — ASSESSMENT & PLAN NOTE
Orders:    polyethylene glycol (Glycolax, Miralax) 17 gram/dose powder; Mix 17 g of powder and drink once daily.

## 2024-12-06 NOTE — ASSESSMENT & PLAN NOTE
Orders:    tamsulosin (Flomax) 0.4 mg 24 hr capsule; Take 1 capsule (0.4 mg) by mouth once daily in the morning. Take before meals.

## 2024-12-06 NOTE — ASSESSMENT & PLAN NOTE
Orders:    tadalafil (Cialis) 10 mg tablet; Take 1 tablet (10 mg) by mouth once daily as needed for erectile dysfunction.

## 2025-02-13 DIAGNOSIS — Z87.19 HISTORY OF DUODENAL ULCER: ICD-10-CM

## 2025-02-13 DIAGNOSIS — N52.9 ERECTILE DYSFUNCTION, UNSPECIFIED ERECTILE DYSFUNCTION TYPE: Primary | ICD-10-CM

## 2025-02-13 RX ORDER — OMEPRAZOLE 40 MG/1
40 CAPSULE, DELAYED RELEASE ORAL
Qty: 30 CAPSULE | Refills: 2 | Status: SHIPPED | OUTPATIENT
Start: 2025-02-13 | End: 2026-02-13

## 2025-02-13 RX ORDER — SILDENAFIL 50 MG/1
50 TABLET, FILM COATED ORAL DAILY PRN
Qty: 12 TABLET | Refills: 3 | Status: SHIPPED | OUTPATIENT
Start: 2025-02-13 | End: 2026-02-13

## 2025-06-25 ENCOUNTER — OFFICE VISIT (OUTPATIENT)
Dept: PRIMARY CARE | Facility: CLINIC | Age: 70
End: 2025-06-25
Payer: MEDICARE

## 2025-06-25 VITALS
HEIGHT: 72 IN | TEMPERATURE: 97.9 F | SYSTOLIC BLOOD PRESSURE: 110 MMHG | DIASTOLIC BLOOD PRESSURE: 70 MMHG | WEIGHT: 200 LBS | BODY MASS INDEX: 27.09 KG/M2

## 2025-06-25 DIAGNOSIS — E55.9 MILD VITAMIN D DEFICIENCY: ICD-10-CM

## 2025-06-25 DIAGNOSIS — R53.83 OTHER FATIGUE: ICD-10-CM

## 2025-06-25 DIAGNOSIS — Z13.1 DIABETES MELLITUS SCREENING: ICD-10-CM

## 2025-06-25 DIAGNOSIS — E78.2 MIXED HYPERLIPIDEMIA: ICD-10-CM

## 2025-06-25 DIAGNOSIS — E03.9 ACQUIRED HYPOTHYROIDISM: ICD-10-CM

## 2025-06-25 DIAGNOSIS — M79.89 LEFT LEG SWELLING: Primary | ICD-10-CM

## 2025-06-25 LAB
25(OH)D3 SERPL-MCNC: 24 NG/ML (ref 30–100)
ALT SERPL W P-5'-P-CCNC: 83 U/L (ref 14–59)
ANION GAP SERPL CALC-SCNC: 16 MMOL/L (ref 10–20)
AST SERPL W P-5'-P-CCNC: 39 U/L (ref 15–37)
BASOPHILS # BLD AUTO: 0.02 X10*3/UL (ref 0.1–1.6)
BASOPHILS NFR BLD AUTO: 0.3 % (ref 0–0.3)
BUN SERPL-MCNC: 16 MG/DL (ref 7–18)
CALCIUM SERPL-MCNC: 9.4 MG/DL (ref 8.5–10.1)
CHLORIDE SERPL-SCNC: 104 MMOL/L (ref 98–107)
CO2 SERPL-SCNC: 24 MMOL/L (ref 21–32)
CREAT SERPL-MCNC: 0.98 MG/DL (ref 0.6–1.1)
EGFRCR SERPLBLD CKD-EPI 2021: 84 ML/MIN/1.73M*2
EOSINOPHIL # BLD AUTO: 0.23 X10*3/UL (ref 0.04–0.5)
EOSINOPHIL NFR BLD AUTO: 2.91 % (ref 0.7–7)
ERYTHROCYTE [DISTWIDTH] IN BLOOD BY AUTOMATED COUNT: 12.9 % (ref 11.5–14.5)
GLUCOSE SERPL-MCNC: 94 MG/DL (ref 74–100)
HCT VFR BLD AUTO: 45.3 % (ref 38.4–51.3)
HGB BLD-MCNC: 16.5 G/DL (ref 12.7–17)
LYMPHOCYTES # BLD AUTO: 2.15 X10*3/UL (ref 0–6)
LYMPHOCYTES NFR BLD AUTO: 26.66 % (ref 20.5–51.1)
MCH RBC QN AUTO: 33.3 PG (ref 26–32)
MCHC RBC AUTO-ENTMCNC: 36.4 G/DL (ref 31–38)
MCV RBC AUTO: 91.3 FL (ref 80–96)
MONOCYTES # BLD AUTO: 0.79 X10*3/UL (ref 1.6–24.9)
MONOCYTES NFR BLD AUTO: 9.74 % (ref 1.7–9.3)
NEUTROPHILS # BLD AUTO: 4.87 X10*3/UL (ref 1.4–6.5)
NEUTROPHILS NFR BLD AUTO: 60.39 % (ref 42.2–75.2)
PLATELET # BLD AUTO: 99.6 X10*3/UL (ref 150–450)
PMV BLD AUTO: 9.4 FL (ref 7.8–11)
POTASSIUM SERPL-SCNC: 4 MMOL/L (ref 3.5–5.1)
RBC # BLD AUTO: 4.96 X10*6/UL (ref 4.1–5.6)
SODIUM SERPL-SCNC: 140 MMOL/L (ref 136–145)
TSH SERPL-ACNC: 4.19 MIU/L (ref 0.44–3.98)
WBC # BLD AUTO: 8.07 X10*3/UL (ref 4.5–10.5)

## 2025-06-25 PROCEDURE — 1123F ACP DISCUSS/DSCN MKR DOCD: CPT | Performed by: PHYSICIAN ASSISTANT

## 2025-06-25 PROCEDURE — 84450 TRANSFERASE (AST) (SGOT): CPT | Performed by: PHYSICIAN ASSISTANT

## 2025-06-25 PROCEDURE — 82306 VITAMIN D 25 HYDROXY: CPT | Performed by: PHYSICIAN ASSISTANT

## 2025-06-25 PROCEDURE — 99214 OFFICE O/P EST MOD 30 MIN: CPT | Performed by: PHYSICIAN ASSISTANT

## 2025-06-25 PROCEDURE — 1158F ADVNC CARE PLAN TLK DOCD: CPT | Performed by: PHYSICIAN ASSISTANT

## 2025-06-25 PROCEDURE — 3008F BODY MASS INDEX DOCD: CPT | Performed by: PHYSICIAN ASSISTANT

## 2025-06-25 PROCEDURE — 84443 ASSAY THYROID STIM HORMONE: CPT | Performed by: PHYSICIAN ASSISTANT

## 2025-06-25 PROCEDURE — 85025 COMPLETE CBC W/AUTO DIFF WBC: CPT | Performed by: PHYSICIAN ASSISTANT

## 2025-06-25 PROCEDURE — 1160F RVW MEDS BY RX/DR IN RCRD: CPT | Performed by: PHYSICIAN ASSISTANT

## 2025-06-25 PROCEDURE — 84460 ALANINE AMINO (ALT) (SGPT): CPT | Performed by: PHYSICIAN ASSISTANT

## 2025-06-25 PROCEDURE — 1036F TOBACCO NON-USER: CPT | Performed by: PHYSICIAN ASSISTANT

## 2025-06-25 PROCEDURE — 80048 BASIC METABOLIC PNL TOTAL CA: CPT | Performed by: PHYSICIAN ASSISTANT

## 2025-06-25 PROCEDURE — 1126F AMNT PAIN NOTED NONE PRSNT: CPT | Performed by: PHYSICIAN ASSISTANT

## 2025-06-25 PROCEDURE — 1159F MED LIST DOCD IN RCRD: CPT | Performed by: PHYSICIAN ASSISTANT

## 2025-06-25 ASSESSMENT — ENCOUNTER SYMPTOMS
APPETITE CHANGE: 0
SLEEP DISTURBANCE: 0
POLYPHAGIA: 0
ABDOMINAL DISTENTION: 0
EYE DISCHARGE: 0
DEPRESSION: 0
WEAKNESS: 0
POLYDIPSIA: 0
ANAL BLEEDING: 0
DIFFICULTY URINATING: 0
COLOR CHANGE: 0
PALPITATIONS: 0
LOSS OF SENSATION IN FEET: 0
ABDOMINAL PAIN: 0
CONFUSION: 0
NUMBNESS: 0
VOMITING: 0
NERVOUS/ANXIOUS: 0
FREQUENCY: 0
ARTHRALGIAS: 0
HEMATURIA: 0
OCCASIONAL FEELINGS OF UNSTEADINESS: 0
MYALGIAS: 0
DIARRHEA: 0
EYE PAIN: 0
SORE THROAT: 0
JOINT SWELLING: 0
CHILLS: 0
DIZZINESS: 0
CONSTIPATION: 0
HEADACHES: 0
COUGH: 0
CHOKING: 0
WHEEZING: 0
NAUSEA: 0
FATIGUE: 0
FACIAL SWELLING: 0
TREMORS: 0
SHORTNESS OF BREATH: 0
FEVER: 0
CHEST TIGHTNESS: 0

## 2025-06-25 ASSESSMENT — PATIENT HEALTH QUESTIONNAIRE - PHQ9
2. FEELING DOWN, DEPRESSED OR HOPELESS: NOT AT ALL
1. LITTLE INTEREST OR PLEASURE IN DOING THINGS: NOT AT ALL
SUM OF ALL RESPONSES TO PHQ9 QUESTIONS 1 AND 2: 0

## 2025-06-25 ASSESSMENT — PAIN SCALES - GENERAL: PAINLEVEL_OUTOF10: 0-NO PAIN

## 2025-06-25 ASSESSMENT — COLUMBIA-SUICIDE SEVERITY RATING SCALE - C-SSRS
1. IN THE PAST MONTH, HAVE YOU WISHED YOU WERE DEAD OR WISHED YOU COULD GO TO SLEEP AND NOT WAKE UP?: NO
2. HAVE YOU ACTUALLY HAD ANY THOUGHTS OF KILLING YOURSELF?: NO
6. HAVE YOU EVER DONE ANYTHING, STARTED TO DO ANYTHING, OR PREPARED TO DO ANYTHING TO END YOUR LIFE?: NO

## 2025-06-25 NOTE — PROGRESS NOTES
"Subjective   Patient ID: Barbara Armenta is a 70 y.o. male with a history of BPH, hx of duodenal ulcer, and carpal tunnel of the right wrist who presents for Leg Swelling (Left/Onset: 5-6 weeks).    HPI The patient is presented with complaints of swollen left leg which he noticed 4-5 weeks ago.  There is no redness or pain.  Denies shortness of breath, tachycardia, chest pain, fatigue, headaches or dizziness.  Stated that he lives sedentary life since he retired.  He does not move much and does not exercise.  Occasionally walks a mile.    Review of Systems   Constitutional:  Negative for appetite change, chills, fatigue and fever.   HENT:  Negative for congestion, ear pain, facial swelling, hearing loss, nosebleeds and sore throat.    Eyes:  Negative for pain, discharge and visual disturbance.   Respiratory:  Negative for cough, choking, chest tightness, shortness of breath and wheezing.    Cardiovascular:  Negative for chest pain, palpitations and leg swelling.   Gastrointestinal:  Negative for abdominal distention, abdominal pain, anal bleeding, constipation, diarrhea, nausea and vomiting.   Endocrine: Negative for cold intolerance, heat intolerance, polydipsia, polyphagia and polyuria.   Genitourinary:  Negative for difficulty urinating, frequency, hematuria and urgency.   Musculoskeletal:  Negative for arthralgias, gait problem, joint swelling and myalgias.        Swelling of left leg   Skin:  Negative for color change and rash.   Neurological:  Negative for dizziness, tremors, syncope, weakness, numbness and headaches.   Psychiatric/Behavioral:  Negative for behavioral problems, confusion, sleep disturbance and suicidal ideas. The patient is not nervous/anxious.        Objective   /70 (Patient Position: Sitting)   Temp 36.6 °C (97.9 °F) (Temporal)   Ht 1.816 m (5' 11.5\")   Wt 90.7 kg (200 lb)   BMI 27.51 kg/m²     Physical Exam  Constitutional:       General: He is not in acute distress.     " Appearance: Normal appearance.   HENT:      Head: Normocephalic and atraumatic.      Nose: Nose normal.   Eyes:      Extraocular Movements: Extraocular movements intact.      Conjunctiva/sclera: Conjunctivae normal.      Pupils: Pupils are equal, round, and reactive to light.   Cardiovascular:      Rate and Rhythm: Normal rate and regular rhythm.      Pulses: Normal pulses.      Heart sounds: Normal heart sounds.   Pulmonary:      Effort: Pulmonary effort is normal.      Breath sounds: Normal breath sounds.   Abdominal:      General: Bowel sounds are normal.      Palpations: Abdomen is soft.   Musculoskeletal:         General: Normal range of motion.      Cervical back: Normal range of motion and neck supple.      Left lower leg: Edema present.   Neurological:      General: No focal deficit present.      Mental Status: He is alert and oriented to person, place, and time.   Psychiatric:         Mood and Affect: Mood normal.         Behavior: Behavior normal.         Thought Content: Thought content normal.         Judgment: Judgment normal.         Assessment/Plan   Swelling of left leg  Need to rule out DVT, order for duplex ultrasound is placed, phone number is provided to call and schedule  The patient was advised to move is much as he can.  Get up every hour and move around  Elevate feet    Weight maintenance:  Body mass index is 27.51 kg/m².  Try to have as many home cooked meals as possible  Avoid processed foods which contain excess calories, sugar, and sodium.  Exercise recommendations:   150 minutes a week to maintain your weight   If you have to lose weight, you need a better diet and exercise plan.     Erectile dysfunction  Cialis 10 mg as needed    Insomnia  Sleep well without trazodone  Sleep hygiene recommended    Constipation  Continue MiraLAX as needed  Drink plenty of fluids  Increase fiber diet    Hypothyroidism  Off of levothyroxine for 2 months  Try to exercise regularly  We obtained TSH, free T4  and free T3 today    hx of duodenal ulcer  On omeprazole 40 mg 30 minutes before breakfast   Avoid caffeine and alcoholic beverages  Avoid spicy and acidic food, such as tomato and citrus fruits  Avoid onions, peppermint and spearmint   Eat small, frequent meals  Do not eat late at night, at least 3 hours before bed  Raise the head of the bed 6-8 inches for sleeping  Wear lose-fitting clothes around your waist     Nocturia  Not taking tamsulosin 0.4 mg daily    Vitamin D deficiency  Self stopped vitamin D 2000 units     Carpal tunnel of the right wrist  Resolved     Wellness exam  Cologuard 2/15/24  Vaccines declined  See your dentist twice a year  Yearly eye exam  see dermatology once a year for a skin check.    We obtained blood work  I will call with results    Follow-up annually per patient request

## 2025-06-26 ENCOUNTER — ANCILLARY PROCEDURE (OUTPATIENT)
Dept: VASCULAR MEDICINE | Facility: CLINIC | Age: 70
End: 2025-06-26
Payer: MEDICARE

## 2025-06-26 DIAGNOSIS — M79.89 LEFT LEG SWELLING: ICD-10-CM

## 2025-06-26 LAB
T3FREE SERPL-MCNC: 3.1 PG/ML (ref 2.3–4.2)
T4 FREE SERPL-MCNC: 1 NG/DL (ref 0.8–1.8)

## 2025-06-26 PROCEDURE — 93971 EXTREMITY STUDY: CPT

## 2025-06-26 PROCEDURE — 93971 EXTREMITY STUDY: CPT | Performed by: INTERNAL MEDICINE

## 2025-06-27 DIAGNOSIS — E55.9 MILD VITAMIN D DEFICIENCY: Primary | ICD-10-CM

## 2025-06-27 RX ORDER — ACETAMINOPHEN 500 MG
125 TABLET ORAL DAILY
Qty: 90 TABLET | Refills: 3 | Status: SHIPPED | OUTPATIENT
Start: 2025-06-27

## 2025-08-22 DIAGNOSIS — Z87.19 HISTORY OF DUODENAL ULCER: ICD-10-CM

## 2025-08-22 RX ORDER — OMEPRAZOLE 40 MG/1
CAPSULE, DELAYED RELEASE ORAL
Qty: 30 CAPSULE | Refills: 2 | Status: SHIPPED | OUTPATIENT
Start: 2025-08-22

## 2025-12-08 ENCOUNTER — APPOINTMENT (OUTPATIENT)
Dept: PRIMARY CARE | Facility: CLINIC | Age: 70
End: 2025-12-08
Payer: MEDICARE